# Patient Record
Sex: MALE | Race: ASIAN | NOT HISPANIC OR LATINO | URBAN - METROPOLITAN AREA
[De-identification: names, ages, dates, MRNs, and addresses within clinical notes are randomized per-mention and may not be internally consistent; named-entity substitution may affect disease eponyms.]

---

## 2024-04-15 ENCOUNTER — EMERGENCY (EMERGENCY)
Facility: HOSPITAL | Age: 24
LOS: 0 days | Discharge: HOME | End: 2024-04-16
Attending: EMERGENCY MEDICINE
Payer: COMMERCIAL

## 2024-04-15 VITALS
OXYGEN SATURATION: 100 % | HEART RATE: 119 BPM | DIASTOLIC BLOOD PRESSURE: 86 MMHG | RESPIRATION RATE: 32 BRPM | SYSTOLIC BLOOD PRESSURE: 137 MMHG

## 2024-04-15 DIAGNOSIS — F41.9 ANXIETY DISORDER, UNSPECIFIED: ICD-10-CM

## 2024-04-15 DIAGNOSIS — F22 DELUSIONAL DISORDERS: ICD-10-CM

## 2024-04-15 DIAGNOSIS — R44.3 HALLUCINATIONS, UNSPECIFIED: ICD-10-CM

## 2024-04-15 DIAGNOSIS — R45.1 RESTLESSNESS AND AGITATION: ICD-10-CM

## 2024-04-15 LAB
ALBUMIN SERPL ELPH-MCNC: 4.9 G/DL — SIGNIFICANT CHANGE UP (ref 3.5–5.2)
ALP SERPL-CCNC: 50 U/L — SIGNIFICANT CHANGE UP (ref 30–115)
ALT FLD-CCNC: 11 U/L — SIGNIFICANT CHANGE UP (ref 0–41)
ANION GAP SERPL CALC-SCNC: 15 MMOL/L — HIGH (ref 7–14)
APAP SERPL-MCNC: <5 UG/ML — LOW (ref 10–30)
AST SERPL-CCNC: 17 U/L — SIGNIFICANT CHANGE UP (ref 0–41)
BASOPHILS # BLD AUTO: 0.03 K/UL — SIGNIFICANT CHANGE UP (ref 0–0.2)
BASOPHILS NFR BLD AUTO: 0.5 % — SIGNIFICANT CHANGE UP (ref 0–1)
BILIRUB SERPL-MCNC: 0.7 MG/DL — SIGNIFICANT CHANGE UP (ref 0.2–1.2)
BUN SERPL-MCNC: 12 MG/DL — SIGNIFICANT CHANGE UP (ref 10–20)
CALCIUM SERPL-MCNC: 9.5 MG/DL — SIGNIFICANT CHANGE UP (ref 8.4–10.5)
CHLORIDE SERPL-SCNC: 103 MMOL/L — SIGNIFICANT CHANGE UP (ref 98–110)
CK SERPL-CCNC: 361 U/L — HIGH (ref 0–225)
CO2 SERPL-SCNC: 20 MMOL/L — SIGNIFICANT CHANGE UP (ref 17–32)
CREAT SERPL-MCNC: 0.8 MG/DL — SIGNIFICANT CHANGE UP (ref 0.7–1.5)
EGFR: 125 ML/MIN/1.73M2 — SIGNIFICANT CHANGE UP
EOSINOPHIL # BLD AUTO: 0.02 K/UL — SIGNIFICANT CHANGE UP (ref 0–0.7)
EOSINOPHIL NFR BLD AUTO: 0.3 % — SIGNIFICANT CHANGE UP (ref 0–8)
ETHANOL SERPL-MCNC: <10 MG/DL — SIGNIFICANT CHANGE UP
GLUCOSE SERPL-MCNC: 112 MG/DL — HIGH (ref 70–99)
HCT VFR BLD CALC: 41.9 % — LOW (ref 42–52)
HGB BLD-MCNC: 14.8 G/DL — SIGNIFICANT CHANGE UP (ref 14–18)
IMM GRANULOCYTES NFR BLD AUTO: 0 % — LOW (ref 0.1–0.3)
LYMPHOCYTES # BLD AUTO: 1.49 K/UL — SIGNIFICANT CHANGE UP (ref 1.2–3.4)
LYMPHOCYTES # BLD AUTO: 25.6 % — SIGNIFICANT CHANGE UP (ref 20.5–51.1)
MCHC RBC-ENTMCNC: 31 PG — SIGNIFICANT CHANGE UP (ref 27–31)
MCHC RBC-ENTMCNC: 35.3 G/DL — SIGNIFICANT CHANGE UP (ref 32–37)
MCV RBC AUTO: 87.7 FL — SIGNIFICANT CHANGE UP (ref 80–94)
MONOCYTES # BLD AUTO: 0.49 K/UL — SIGNIFICANT CHANGE UP (ref 0.1–0.6)
MONOCYTES NFR BLD AUTO: 8.4 % — SIGNIFICANT CHANGE UP (ref 1.7–9.3)
NEUTROPHILS # BLD AUTO: 3.78 K/UL — SIGNIFICANT CHANGE UP (ref 1.4–6.5)
NEUTROPHILS NFR BLD AUTO: 65.2 % — SIGNIFICANT CHANGE UP (ref 42.2–75.2)
NRBC # BLD: 0 /100 WBCS — SIGNIFICANT CHANGE UP (ref 0–0)
PLATELET # BLD AUTO: 227 K/UL — SIGNIFICANT CHANGE UP (ref 130–400)
PMV BLD: 9.6 FL — SIGNIFICANT CHANGE UP (ref 7.4–10.4)
POTASSIUM SERPL-MCNC: 3.7 MMOL/L — SIGNIFICANT CHANGE UP (ref 3.5–5)
POTASSIUM SERPL-SCNC: 3.7 MMOL/L — SIGNIFICANT CHANGE UP (ref 3.5–5)
PROT SERPL-MCNC: 7.1 G/DL — SIGNIFICANT CHANGE UP (ref 6–8)
RBC # BLD: 4.78 M/UL — SIGNIFICANT CHANGE UP (ref 4.7–6.1)
RBC # FLD: 11.9 % — SIGNIFICANT CHANGE UP (ref 11.5–14.5)
SALICYLATES SERPL-MCNC: <0.3 MG/DL — LOW (ref 4–30)
SARS-COV-2 RNA SPEC QL NAA+PROBE: SIGNIFICANT CHANGE UP
SODIUM SERPL-SCNC: 138 MMOL/L — SIGNIFICANT CHANGE UP (ref 135–146)
WBC # BLD: 5.81 K/UL — SIGNIFICANT CHANGE UP (ref 4.8–10.8)
WBC # FLD AUTO: 5.81 K/UL — SIGNIFICANT CHANGE UP (ref 4.8–10.8)

## 2024-04-15 PROCEDURE — 87635 SARS-COV-2 COVID-19 AMP PRB: CPT

## 2024-04-15 PROCEDURE — 80053 COMPREHEN METABOLIC PANEL: CPT

## 2024-04-15 PROCEDURE — 93010 ELECTROCARDIOGRAM REPORT: CPT

## 2024-04-15 PROCEDURE — 80307 DRUG TEST PRSMV CHEM ANLYZR: CPT

## 2024-04-15 PROCEDURE — 80354 DRUG SCREENING FENTANYL: CPT

## 2024-04-15 PROCEDURE — 99285 EMERGENCY DEPT VISIT HI MDM: CPT | Mod: 25

## 2024-04-15 PROCEDURE — 93005 ELECTROCARDIOGRAM TRACING: CPT

## 2024-04-15 PROCEDURE — 99285 EMERGENCY DEPT VISIT HI MDM: CPT

## 2024-04-15 PROCEDURE — 82550 ASSAY OF CK (CPK): CPT

## 2024-04-15 PROCEDURE — 85025 COMPLETE CBC W/AUTO DIFF WBC: CPT

## 2024-04-15 PROCEDURE — 96372 THER/PROPH/DIAG INJ SC/IM: CPT

## 2024-04-15 PROCEDURE — 36415 COLL VENOUS BLD VENIPUNCTURE: CPT

## 2024-04-15 RX ORDER — OLANZAPINE 15 MG/1
10 TABLET, FILM COATED ORAL ONCE
Refills: 0 | Status: COMPLETED | OUTPATIENT
Start: 2024-04-15 | End: 2024-04-15

## 2024-04-15 RX ADMIN — OLANZAPINE 10 MILLIGRAM(S): 15 TABLET, FILM COATED ORAL at 19:49

## 2024-04-15 NOTE — ED PROVIDER NOTE - NSFOLLOWUPINSTRUCTIONS_ED_ALL_ED_FT
Anxiety    Generalized anxiety disorder (DELFINO) is a mental disorder. It is defined as anxiety that is not necessarily related to specific events or activities or is out of proportion to said events. Symptoms include restlessness, fatigue, difficulty concentrations, irritability and difficulty concentrating. It may interfere with life functions, including relationships, work, and school. If you were started on a medication, make sure to take exactly as prescribed and follow up with a psychiatrist.    SEEK IMMEDIATE MEDICAL CARE IF YOU HAVE ANY OF THE FOLLOWING SYMPTOMS: thoughts about hurting killing yourself, thoughts about hurting or killing somebody else, hallucinations, or worsening depression.

## 2024-04-15 NOTE — ED PROVIDER NOTE - PATIENT PORTAL LINK FT
You can access the FollowMyHealth Patient Portal offered by [facility] by registering at the following website: [email or web address]. By joining [employer]’s FollowMyHealth portal, you will also be able to view your health information using other applications (apps) compatible with our system.

## 2024-04-15 NOTE — ED PROVIDER NOTE - INTERPRETATION
ED EKG: my independent interpretation - Dr. Theo Quintanilla : Sinus tachycardia at 105, normal axis, no ST elevations, normal QTc

## 2024-04-15 NOTE — ED PROVIDER NOTE - PHYSICAL EXAMINATION
VITAL SIGNS: I have reviewed nursing notes and confirm.  CONSTITUTIONAL: anxious appearing male in NAD  SKIN: Skin exam is warm and dry, no acute rash.  HEAD: Normocephalic; atraumatic.  EYES: PERRL, conjunctiva and sclera clear.  ENT: mmm  NECK: FROM, no meningismus  CARD: S1, S2 normal; no murmurs, gallops, or rubs. Regular rate and rhythm.  RESP: Normal respiratory effort, no tachypnea or distress. Lungs CTAB, no wheezes, rales or rhonchi.  ABD: soft, NT/ND.  EXT: Normal ROM. No clubbing, cyanosis or edema. Mild tremors that are distractible  NEURO: Alert, oriented. Grossly unremarkable. No focal deficits.  PSYCH: responding to internal stimuli

## 2024-04-15 NOTE — ED PROVIDER NOTE - CLINICAL SUMMARY MEDICAL DECISION MAKING FREE TEXT BOX
Labs, EKG and imaging were ordered, where indicated.  Independent interpretation of any labs, EKG & imaging that was ordered was performed by me, Dr. Chand. Appropriate medications for patient's presenting complaints were ordered and effects were reassessed, where indicated.  Patient's records (prior hospital, ED visit, and/or nursing home note) were reviewed, if available.  Additional history was obtained from EMS, family, and/or PCP (where available).  Escalation to admission/observation was considered.  However patient feels much better and patient/parent is comfortable with discharge.  Appropriate follow-up was arranged.     Authored by Dr. Sherri Chand: s/o to me by Dr. Quintanilla - 24M p/w anxiety, paranoia, labs as resulted, s/o to me pending telepsych consult who cleared patient for discharge and outpatient f/u - all results d/w pt & cousin @ bedside and copies given, strict return precautions discussed, rec outpt Psych f/u

## 2024-04-15 NOTE — ED ADULT TRIAGE NOTE - CHIEF COMPLAINT QUOTE
pt brought to ED by father and cousin for hallucinations, pt believes that there are people watching him. pt's cousin denies pt being aggressive, states he is more fearful, has trouble sleeping. denies wanting to hurt himself or hurt other people, denies drug use. cousin reports he uses e-cigarette. an  was attempted to be used in triage, but  was unable to understand what the pt was saying due to crying and not speaking clearly. unable to obtain temperature in triage

## 2024-04-15 NOTE — ED PROVIDER NOTE - OBJECTIVE STATEMENT
24-year-old male with no known PMH presents ED for evaluation of paranoia.  Patient is not very cooperative with history or physical exam due to agitation.  Cousin at bedside states that patient is normally A&O x 3, calm and cooperative and conversive.  States over the last 4 days he has been paranoid and responding to internal stimuli.  He thinks that people can hear his thoughts and he is also hearing voices.  Notes tremors in his 4 extremities.  Patient admits to vaping but denies other substance abuse.  Denies trauma.  Denies fever/chills, CP, SOB, abdominal pain, N/V/D, extremity numbness/weakness/paresthesias.

## 2024-04-15 NOTE — ED PROVIDER NOTE - PROGRESS NOTE DETAILS
Authored by Dr. Quintanilla:  still waiting till psychiatric evaluation Note authored by Dr. Quintanilla: At this time, patient signout to Dr. Chand to follow up on psychiatric evaluation and dispo MYLES: pt seen by psych and cleared for discharge home. He is now at baseline per family, awake, alert, no tremors, conversing appropriately. Outpatient information faxed from telepsych provided to family. Supportive care and return precautions advised.    Patient to be discharged from ED. Any available test results were discussed with patient and/or family. Verbal instructions given, including instructions to return to ED immediately for any new, worsening, or concerning symptoms. Patient endorsed understanding. Written discharge instructions additionally given, including follow-up plan.

## 2024-04-15 NOTE — ED PROVIDER NOTE - NSFOLLOWUPCLINICS_GEN_ALL_ED_FT
[facility]  OP [facility]  [address] [place] [id number]  Phone: [phone]  Fax:   Follow Up Time: 1-3 Days

## 2024-04-15 NOTE — ED PROVIDER NOTE - ATTENDING CONTRIBUTION TO CARE
24-year-old male without significant past medical history now presents with paranoia and internal auditory hallucination and x 5 days.  No fever.  No neck pain.  No abdominal pain.  Family at the bedside.    vss, tachycardic afebrile agitated cannot find a comfortable position clearly responding to internal stimuli, pink conj, anicteric, MMM, neck supple, CTAB, RRR, equal radial pulses bilat, abd soft/nt/nd, no cva tend. no calves tend, no edema, no fnd. no rashes.

## 2024-04-16 VITALS
DIASTOLIC BLOOD PRESSURE: 74 MMHG | OXYGEN SATURATION: 99 % | SYSTOLIC BLOOD PRESSURE: 110 MMHG | RESPIRATION RATE: 16 BRPM | TEMPERATURE: 97 F | HEART RATE: 58 BPM

## 2024-04-16 LAB
AMPHET UR-MCNC: NEGATIVE — SIGNIFICANT CHANGE UP
BARBITURATES UR SCN-MCNC: NEGATIVE — SIGNIFICANT CHANGE UP
BENZODIAZ UR-MCNC: NEGATIVE — SIGNIFICANT CHANGE UP
COCAINE METAB.OTHER UR-MCNC: NEGATIVE — SIGNIFICANT CHANGE UP
DRUG SCREEN 1, URINE RESULT: SIGNIFICANT CHANGE UP
FENTANYL UR QL: NEGATIVE — SIGNIFICANT CHANGE UP
METHADONE UR-MCNC: NEGATIVE — SIGNIFICANT CHANGE UP
OPIATES UR-MCNC: NEGATIVE — SIGNIFICANT CHANGE UP
OXYCODONE UR-MCNC: NEGATIVE — SIGNIFICANT CHANGE UP
PCP UR-MCNC: NEGATIVE — SIGNIFICANT CHANGE UP
PROPOXYPHENE QUALITATIVE URINE RESULT: NEGATIVE — SIGNIFICANT CHANGE UP
THC UR QL: NEGATIVE — SIGNIFICANT CHANGE UP

## 2024-04-16 NOTE — ED BEHAVIORAL HEALTH ASSESSMENT NOTE - HPI (INCLUDE ILLNESS QUALITY, SEVERITY, DURATION, TIMING, CONTEXT, MODIFYING FACTORS, ASSOCIATED SIGNS AND SYMPTOMS)
Pt is a 25yo Chinese male, Mandarin speaking, single, domiciled with cousins, moved [place] from [place] in [place], with no formal pph, no prior admissions, no outpt, no past suicide attempts, no substance, no known pmh; he is brought in by his family for paranoia and hallucinations.    Mandarin  utilized. Patient is calm, cooperative, Pt is a 25yo Chinese male, Mandarin speaking, single, domiciled with cousins, [place] from [place], with no formal pph, no prior admissions, no outpt, no past suicide attempts, no substance, no known pmh; he is brought in by his family for paranoia and hallucinations.    Mandarin  utilized. Patient is anxious, cooperative, withdrawn and soft speech. He says for the past two weeks his mood hasn't felt stable and he's been feeling more pessimistic. He feels he's being viewed in a negative light by others and frequently overthinking with panic attacks. He fears people are laughing at him online and know too much about him which causes him depressed mood and anxiety. This began when he told one friend something private, and then found out others he didn't tell knew about it. He feels his work colleagues are laughing at him, judging, or ridiculing him. He denies any SI/P/I or prior SAs. Pt denies any AVH and does not feel people are following or spying on him. He denies feeling he is in danger but worried other people are focusing on him. He came with his father who doesn't live in [place] and cousin to ER. Pt states his sleep is worsening Pt is a 23yo Chinese male, Mandarin speaking, single, domiciled with cousins, [place] from [place], with no formal pph, no prior admissions, no outpt, no past suicide attempts, no substance, no known pmh; he is brought in by his family for paranoia and hallucinations.    Mandarin  utilized. Patient is anxious, cooperative, withdrawn and soft speech. He says for the past two weeks his mood hasn't felt stable and he's been feeling more pessimistic. He feels he's being viewed in a negative light by others and frequently overthinking with panic attacks. He fears people are laughing at him online and know too much about him which causes him depressed mood and anxiety. This began when he told one friend something private, and then found out others he didn't tell knew about it. He feels his work colleagues are laughing at him, judging, or ridiculing him. He denies any SI/P/I or prior SAs. Pt denies any AVH and does not feel people are following or spying on him. He denies feeling he is in danger but worried other people are focusing on him. He came with his father who doesn't live in [place] and cousin to ER. Pt states his sleep is worsening to about 5 to 6 hours per night, worsening appetite, shaking, and increased anxiety. Pt says the medications in the ER have significantly improved the shaking. He feels safe at home and is not interested in inpatient admission.     see  note for collateral info from cousin he lives with -

## 2024-04-16 NOTE — ED BEHAVIORAL HEALTH NOTE - BEHAVIORAL HEALTH NOTE
“Collateral has requested that the information provided remain confidential: Yes [ ] No [X ]        Collateral  has provided information that patient is/may be unaware of: Yes [  ] No [X]”         “Patient gives permission to obtain collateral from __Lorne, Cousin___:       (  ) Yes       (  )  No       Rationale for overriding objection                (  ) Lack of capacity. Details: _______               ( X) Assessing risk of danger to self/others. Details: ___Possible psychosis/Paranoia_____       Rationale for selecting specific collateral source                 ( X) Potential to impact risk of danger to self/others and no alternative equivalent. Details: __Collateral listed as emergency contact in ED, with patient in ED___”       COVID Exposure Screen- collateral (i.e. third-party, chart review, belongings, etc; include EMS and ED staff)     Has the patient been tested for COVID-19 in the last 90 days?  ( X) Yes   (  ) No   (  ) Unknown- Reason: _____     IF YES: Date of test(s), type of test(s), result(s) for ALL tests in last 90 days: ___Negative per charting_____     In the past 10 days, has the patient been around anyone with a positive COVID-19 test? (  ) Yes   ( X) No   (  ) Unknown- Reason: ____     IF YES: Was the patient closer than 6 feet of them for a total of 15 minutes or more in a 24 hour period? (  ) Yes   (  ) No   (  ) Unknown- Reason: _____      ========================  FOR EACH COLLATERAL  ========================  NAME: Lorne  NUMBER: [phone]  RELATIONSHIP: Cousin  RELIABILITY: Fair historian, reliable to present events.   COMMENTS: Brought patient to ED for evaluation.      ========================  PATIENT DEMOGRAPHICS: Patient is a 24 y.o Male domiciled in private home with father, male and female adult cousins, employed part time as a [employer], noncaregiver.   ========================  HPI  BASELINE FUNCTIONING:   DATE HPI STARTED: Last Thursday, escalating today.   DECOMPENSATION: Collateral reports that on Thursday patient had what seemed like a panic attack; female cousin that was home spoke to him and was able to help him calm down by 4am. Unable to endorse what specifically happened that triggered this at this time. Collateral reports last Friday the same had occurred, and collateral and patient stayed up talking till 2am, patient seemed to calm down. At this point, patient stated someone exposed him online, referring to his online video kaila friends repeating information he only told his friends in person. Patient still did not disclose what was exposed at this point, however provided examples of things said to his real like friends that were repeated online "he should be himself" and pt reporting not liking sad chinese music. Collateral reports that patient seemed to be okay today, however around 8pm patient was found on the floor shaking, crying, and was too weak to get up. Family brought patient to ED for evaluation with concern for new onset panic, although no other acute safety issues identified at this time.   SUICIDALITY: Collateral denies SI/SIB/SA.   VIOLENCE: Collateral denies HI/AH/VH or violence.   SUBSTANCE: Reports patient has switched from real cigarettes to e-cigarettes since he quit his job a few weeks ago, denies other known substances at this time.        ========================  PAST PSYCHIATRIC HISTORY  ========================  DATE PAST PSYCHIATRIC HISTORY STARTED: -  MAIN PSYCHIATRIC DIAGNOSIS: Collateral denies known.   PSYCHIATRIC HOSPITALIZATIONS: Collateral denies known.   PRIOR ILLNESS: Collateral denies any prior psychiatric sx or dx.  SUICIDALITY: Collateral denies SI/SIB/SA.   VIOLENCE: Collateral denies HI/AH/VH or violence.   SUBSTANCE USE: Collateral reports patient smoked cigarettes.     ==============  OTHER HISTORY  ==============  CURRENT MEDICATION: Collateral denies known.   MEDICAL HISTORY: Collateral denies known.   ALLERGIES: Reports allergy to some antibiotics, did not provide specific name.   LEGAL ISSUES: Collateral denies known.   FIREARM ACCESS: Collateral denies known.   SOCIAL HISTORY: Collateral denies known.   FAMILY HISTORY: Collateral denies known.   DEVELOPMENTAL HISTORY: Collateral denies known. “Collateral has requested that the information provided remain confidential: Yes [ ] No [X ]        Collateral  has provided information that patient is/may be unaware of: Yes [  ] No [X]”         “Patient gives permission to obtain collateral from __Lorne, Cousin___:       (  ) Yes       (  )  No       Rationale for overriding objection                (  ) Lack of capacity. Details: _______               ( X) Assessing risk of danger to self/others. Details: ___Possible psychosis/Paranoia_____       Rationale for selecting specific collateral source                 ( X) Potential to impact risk of danger to self/others and no alternative equivalent. Details: __Collateral listed as emergency contact in ED, with patient in ED___”       COVID Exposure Screen- collateral (i.e. third-party, chart review, belongings, etc; include EMS and ED staff)     Has the patient been tested for COVID-19 in the last 90 days?  ( X) Yes   (  ) No   (  ) Unknown- Reason: _____     IF YES: Date of test(s), type of test(s), result(s) for ALL tests in last 90 days: ___Negative per charting_____     In the past 10 days, has the patient been around anyone with a positive COVID-19 test? (  ) Yes   ( X) No   (  ) Unknown- Reason: ____     IF YES: Was the patient closer than 6 feet of them for a total of 15 minutes or more in a 24 hour period? (  ) Yes   (  ) No   (  ) Unknown- Reason: _____      ========================  FOR EACH COLLATERAL  ========================  NAME: Lorne  NUMBER: [phone]  RELATIONSHIP: Cousin  RELIABILITY: Fair historian, reliable to present events.   COMMENTS: Brought patient to ED for evaluation.      ========================  PATIENT DEMOGRAPHICS: Patient is a 24 y.o Male domiciled in private home with father, male and female adult cousins, employed part time as a [employer], noncaregiver.   ========================  HPI  BASELINE FUNCTIONING: Collateral reports patient able to attend to ADL's without incident, reports unremarkable sleeping/eating patterns. 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 Collateral denies involvement in outpatient therapy or psychiatry.   DATE HPI STARTED: Last Thursday, escalating today.   DECOMPENSATION: Collateral reports that on Thursday patient had what seemed like a panic attack; female cousin that was home spoke to him and was able to help him calm down by 4am. Unable to endorse what specifically happened that triggered this at this time. Collateral reports last Friday the same had occurred, and collateral and patient stayed up talking till 2am, patient seemed to calm down. At this point, patient stated someone exposed him online, referring to his online video kaila friends repeating information he only told his friends in person. Patient still did not disclose what was exposed at this point, however provided examples of things said to his real like friends that were repeated online "he should be himself" and pt reporting not liking sad chinese music. Collateral reports patient stated he was sleeping well although seemed tired, and was aware from aunt that he did not eat at work on Friday and seemed panicked while working.   Collateral reports that patient seemed to be okay today, however around 8pm patient was found on the floor shaking, crying, and was too weak to get up. Family brought patient to ED for evaluation with concern for new onset panic, although no other acute safety issues identified at this time.   SUICIDALITY: Collateral denies SI/SIB/SA.   VIOLENCE: Collateral denies HI/AH/VH or violence.   SUBSTANCE: Reports patient has switched from real cigarettes to e-cigarettes since he quit his job a few weeks ago, denies other known substances at this time.        ========================  PAST PSYCHIATRIC HISTORY  ========================  DATE PAST PSYCHIATRIC HISTORY STARTED: -  MAIN PSYCHIATRIC DIAGNOSIS: Collateral denies known.   PSYCHIATRIC HOSPITALIZATIONS: Collateral denies known.   PRIOR ILLNESS: Collateral denies any prior psychiatric sx or dx.  SUICIDALITY: Collateral denies SI/SIB/SA.   VIOLENCE: Collateral denies HI/AH/VH or violence.   SUBSTANCE USE: Collateral reports patient smoked cigarettes.     ==============  OTHER HISTORY  ==============  CURRENT MEDICATION: Collateral denies known.   MEDICAL HISTORY: Collateral denies known.   ALLERGIES: Reports allergy to some antibiotics, did not provide specific name.   LEGAL ISSUES: Collateral denies known.   FIREARM ACCESS: Collateral denies known.   SOCIAL HISTORY: Collateral denies known.   FAMILY HISTORY: Collateral denies known.   DEVELOPMENTAL HISTORY: Collateral denies known.

## 2024-04-16 NOTE — ED BEHAVIORAL HEALTH ASSESSMENT NOTE - SUMMARY
Pt is a 23yo Chinese male, Mandarin speaking, single, domiciled with cousins, [place], with no formal pph, no prior admissions, no outpt, no past suicide attempts, no substance, no known pmh; he is brought in by his family for paranoia and hallucinations.    Patient presents with severe anxiety, paranoia that others are ridiculing or judging him, and poor sleep.     While these symptoms are new, pt does not exhibit overt thought disorder, s/sx of francisco, psychosis, or   Patient denies any suicidal thoughts and feels safe at home. He is not interested in inpatient admission and accepts outpatient resources. He does not meet criteria for involuntary admission at this time as pt does not pose an imminent risk for self harm or harm to others. Pt is a 25yo Chinese male, Mandarin speaking, single, domiciled with cousins, [place] from [place], with no formal pph, no prior admissions, no outpt, no past suicide attempts, no substance, no known pmh; he is brought in by his family for paranoia and hallucinations.    Patient presents with severe anxiety, paranoia that others are ridiculing or judging him, and poor sleep. He denies any suicidal or violent thoughts, auditory or visual hallucinations    While these symptoms are new, pt does not exhibit overt thought disorder, s/sx of francisco, psychosis, or   Patient denies any suicidal thoughts and feels safe at home. He is not interested in inpatient admission and accepts outpatient resources. He does not meet criteria for involuntary admission at this time as pt does not pose an imminent risk for self harm or harm to others.

## 2024-04-16 NOTE — ED BEHAVIORAL HEALTH ASSESSMENT NOTE - DETAILS
hpi patient advised to return to ED or call 911 if symptoms worsen or thoughts to harm self or others occur. family aware

## 2024-04-16 NOTE — ED BEHAVIORAL HEALTH ASSESSMENT NOTE - SAFETY PLAN ADDT'L DETAILS
Education provided regarding environmental safety / lethal means restriction/Provision of National Suicide Prevention Lifeline 7-247-138-TALK (1923)

## 2024-04-16 NOTE — ED BEHAVIORAL HEALTH ASSESSMENT NOTE - NICOTINE
Have Your Bumps Been Treated?: not been treated
Is This A New Presentation, Or A Follow-Up?: Bump
None known

## 2024-04-16 NOTE — ED BEHAVIORAL HEALTH ASSESSMENT NOTE - NSBHMSERELATED_PSY_A_CORE
"my blood pressure is low"   pt reports he has HTN, noted it was low in urgent care (there for a cough) Fair

## 2024-04-16 NOTE — ED ADULT NURSE NOTE - BIRTH SEX
Male Hydroxychloroquine Pregnancy And Lactation Text: This medication has been shown to cause fetal harm but it isn't assigned a Pregnancy Risk Category. There are small amounts excreted in breast milk.

## 2024-04-18 ENCOUNTER — OUTPATIENT (OUTPATIENT)
Dept: OUTPATIENT SERVICES | Facility: HOSPITAL | Age: 24
LOS: 1 days | Discharge: STILL ADMITTED OR ADMINISTRATIVE | End: 2024-04-18
Payer: MEDICAID

## 2024-04-18 PROBLEM — Z78.9 OTHER SPECIFIED HEALTH STATUS: Chronic | Status: ACTIVE | Noted: 2024-04-15

## 2024-04-18 PROCEDURE — 99214 OFFICE O/P EST MOD 30 MIN: CPT

## 2024-04-18 PROCEDURE — 90833 PSYTX W PT W E/M 30 MIN: CPT

## 2024-04-18 PROCEDURE — 90839 PSYTX CRISIS INITIAL 60 MIN: CPT

## 2024-04-22 DIAGNOSIS — F29 UNSPECIFIED PSYCHOSIS NOT DUE TO A SUBSTANCE OR KNOWN PHYSIOLOGICAL CONDITION: ICD-10-CM

## 2024-05-02 PROCEDURE — 99214 OFFICE O/P EST MOD 30 MIN: CPT
